# Patient Record
(demographics unavailable — no encounter records)

---

## 2025-02-23 NOTE — PHYSICAL EXAM
[NL (0)] : extension 0 degrees [5___] : hamstring 5[unfilled]/5 [Positive] : positive anterior draw [] : antalgic [Right] : right knee [Lateral] : lateral [[place]] : skyline [AP Standing] : anteroposterior standing [There are no fractures, subluxations or dislocations. No significant abnormalities are seen] : There are no fractures, subluxations or dislocations. No significant abnormalities are seen [FreeTextEntry9] : Physes open. [TWNoteComboBox7] : flexion 100 degrees

## 2025-02-23 NOTE — HISTORY OF PRESENT ILLNESS
[de-identified] : The patient is a 14 year old female who presents today complaining of right knee pain Date of Injury/Onset: 02/22/25  pain level- 7/10 Mechanism of injury: Patient was playing in a soccer tournament and was hit behind her knee, patient heard a pop Quality of symptoms: sharp pain lateral  Improves with: Ice  Worse with: weight bearing  Prior treatment: Denies any prior treatment for her right knee  Prior Imaging: None  Additional Information: Currently in 8th grade- Plays soccer

## 2025-02-23 NOTE — HISTORY OF PRESENT ILLNESS
[de-identified] : The patient is a 14 year old female who presents today complaining of right knee pain Date of Injury/Onset: 02/22/25  pain level- 7/10 Mechanism of injury: Patient was playing in a soccer tournament and was hit behind her knee, patient heard a pop Quality of symptoms: sharp pain lateral  Improves with: Ice  Worse with: weight bearing  Prior treatment: Denies any prior treatment for her right knee  Prior Imaging: None  Additional Information: Currently in 8th grade- Plays soccer

## 2025-02-23 NOTE — PHYSICAL EXAM
[NL (0)] : extension 0 degrees [5___] : hamstring 5[unfilled]/5 [Positive] : positive anterior draw [] : antalgic [Right] : right knee [Lateral] : lateral [[place]] : skyline [AP Standing] : anteroposterior standing [There are no fractures, subluxations or dislocations. No significant abnormalities are seen] : There are no fractures, subluxations or dislocations. No significant abnormalities are seen [FreeTextEntry9] : Physes open. [TWNoteComboBox7] : flexion 100 degrees

## 2025-02-23 NOTE — ASSESSMENT
[FreeTextEntry1] : Right knee internal derangement - Patient lives in [place]. She returns home tomorrow. - Patient felt a pop in her right knee while playing soccer s/p twisting injury. Pain with [place]man/anterior drawer. MRI ordered to evaluate for ACL injury. - Rest, ice, NSAIDs PRN for pain.  - Recommended hinged knee brace and crutches. Will purchase OTC. - Avoid painful activity. No gym/sports. - All questions answered. Father present. - F/u after MRI with orthopedist in [place].